# Patient Record
Sex: MALE | Race: BLACK OR AFRICAN AMERICAN | NOT HISPANIC OR LATINO | Employment: FULL TIME | URBAN - METROPOLITAN AREA
[De-identification: names, ages, dates, MRNs, and addresses within clinical notes are randomized per-mention and may not be internally consistent; named-entity substitution may affect disease eponyms.]

---

## 2018-11-13 ENCOUNTER — HOSPITAL ENCOUNTER (EMERGENCY)
Facility: HOSPITAL | Age: 33
Discharge: HOME | End: 2018-11-13
Attending: EMERGENCY MEDICINE | Admitting: EMERGENCY MEDICINE

## 2018-11-13 ENCOUNTER — APPOINTMENT (OUTPATIENT)
Dept: CT IMAGING | Facility: HOSPITAL | Age: 33
End: 2018-11-13

## 2018-11-13 VITALS
DIASTOLIC BLOOD PRESSURE: 71 MMHG | SYSTOLIC BLOOD PRESSURE: 115 MMHG | RESPIRATION RATE: 18 BRPM | HEART RATE: 81 BPM | TEMPERATURE: 100.1 F | BODY MASS INDEX: 33.6 KG/M2 | WEIGHT: 240 LBS | HEIGHT: 71 IN | OXYGEN SATURATION: 97 %

## 2018-11-13 DIAGNOSIS — D72.829 LEUKOCYTOSIS, UNSPECIFIED TYPE: ICD-10-CM

## 2018-11-13 DIAGNOSIS — K57.92 ACUTE DIVERTICULITIS: Primary | ICD-10-CM

## 2018-11-13 LAB
ALBUMIN SERPL-MCNC: 4.77 G/DL (ref 3.2–4.8)
ALBUMIN/GLOB SERPL: 1.9 G/DL (ref 1.5–2.5)
ALP SERPL-CCNC: 99 U/L (ref 25–100)
ALT SERPL W P-5'-P-CCNC: 29 U/L (ref 7–40)
ANION GAP SERPL CALCULATED.3IONS-SCNC: 5 MMOL/L (ref 3–11)
AST SERPL-CCNC: 23 U/L (ref 0–33)
BASOPHILS # BLD AUTO: 0.02 10*3/MM3 (ref 0–0.2)
BASOPHILS NFR BLD AUTO: 0.1 % (ref 0–1)
BILIRUB SERPL-MCNC: 0.7 MG/DL (ref 0.3–1.2)
BILIRUB UR QL STRIP: NEGATIVE
BUN BLD-MCNC: 12 MG/DL (ref 9–23)
BUN/CREAT SERPL: 11 (ref 7–25)
CALCIUM SPEC-SCNC: 9.7 MG/DL (ref 8.7–10.4)
CHLORIDE SERPL-SCNC: 102 MMOL/L (ref 99–109)
CLARITY UR: CLEAR
CO2 SERPL-SCNC: 28 MMOL/L (ref 20–31)
COLOR UR: YELLOW
CREAT BLD-MCNC: 1.09 MG/DL (ref 0.6–1.3)
DEPRECATED RDW RBC AUTO: 37.3 FL (ref 37–54)
EOSINOPHIL # BLD AUTO: 0.05 10*3/MM3 (ref 0–0.3)
EOSINOPHIL NFR BLD AUTO: 0.2 % (ref 0–3)
ERYTHROCYTE [DISTWIDTH] IN BLOOD BY AUTOMATED COUNT: 12 % (ref 11.3–14.5)
GFR SERPL CREATININE-BSD FRML MDRD: 95 ML/MIN/1.73
GLOBULIN UR ELPH-MCNC: 2.5 GM/DL
GLUCOSE BLD-MCNC: 95 MG/DL (ref 70–100)
GLUCOSE UR STRIP-MCNC: NEGATIVE MG/DL
HCT VFR BLD AUTO: 46.6 % (ref 38.9–50.9)
HGB BLD-MCNC: 15.1 G/DL (ref 13.1–17.5)
HGB UR QL STRIP.AUTO: NEGATIVE
HOLD SPECIMEN: NORMAL
HOLD SPECIMEN: NORMAL
IMM GRANULOCYTES # BLD: 0.2 10*3/MM3 (ref 0–0.03)
IMM GRANULOCYTES NFR BLD: 0.9 % (ref 0–0.6)
KETONES UR QL STRIP: NEGATIVE
LEUKOCYTE ESTERASE UR QL STRIP.AUTO: NEGATIVE
LIPASE SERPL-CCNC: 49 U/L (ref 6–51)
LYMPHOCYTES # BLD AUTO: 3.08 10*3/MM3 (ref 0.6–4.8)
LYMPHOCYTES NFR BLD AUTO: 14.6 % (ref 24–44)
MCH RBC QN AUTO: 27.9 PG (ref 27–31)
MCHC RBC AUTO-ENTMCNC: 32.4 G/DL (ref 32–36)
MCV RBC AUTO: 86 FL (ref 80–99)
MONOCYTES # BLD AUTO: 1.52 10*3/MM3 (ref 0–1)
MONOCYTES NFR BLD AUTO: 7.2 % (ref 0–12)
NEUTROPHILS # BLD AUTO: 16.19 10*3/MM3 (ref 1.5–8.3)
NEUTROPHILS NFR BLD AUTO: 77 % (ref 41–71)
NITRITE UR QL STRIP: NEGATIVE
PH UR STRIP.AUTO: 6.5 [PH] (ref 5–8)
PLATELET # BLD AUTO: 188 10*3/MM3 (ref 150–450)
PMV BLD AUTO: 12 FL (ref 6–12)
POTASSIUM BLD-SCNC: 4.1 MMOL/L (ref 3.5–5.5)
PROT SERPL-MCNC: 7.3 G/DL (ref 5.7–8.2)
PROT UR QL STRIP: NEGATIVE
RBC # BLD AUTO: 5.42 10*6/MM3 (ref 4.2–5.76)
SODIUM BLD-SCNC: 135 MMOL/L (ref 132–146)
SP GR UR STRIP: 1.05 (ref 1–1.03)
UROBILINOGEN UR QL STRIP: ABNORMAL
WBC NRBC COR # BLD: 21.06 10*3/MM3 (ref 3.5–10.8)
WHOLE BLOOD HOLD SPECIMEN: NORMAL
WHOLE BLOOD HOLD SPECIMEN: NORMAL

## 2018-11-13 PROCEDURE — 99283 EMERGENCY DEPT VISIT LOW MDM: CPT

## 2018-11-13 PROCEDURE — 83690 ASSAY OF LIPASE: CPT | Performed by: EMERGENCY MEDICINE

## 2018-11-13 PROCEDURE — 85025 COMPLETE CBC W/AUTO DIFF WBC: CPT | Performed by: EMERGENCY MEDICINE

## 2018-11-13 PROCEDURE — 96375 TX/PRO/DX INJ NEW DRUG ADDON: CPT

## 2018-11-13 PROCEDURE — 25010000002 HYDROMORPHONE 1 MG/ML SOLUTION: Performed by: EMERGENCY MEDICINE

## 2018-11-13 PROCEDURE — 96374 THER/PROPH/DIAG INJ IV PUSH: CPT

## 2018-11-13 PROCEDURE — 74177 CT ABD & PELVIS W/CONTRAST: CPT

## 2018-11-13 PROCEDURE — 81003 URINALYSIS AUTO W/O SCOPE: CPT | Performed by: EMERGENCY MEDICINE

## 2018-11-13 PROCEDURE — 25010000002 IOPAMIDOL 61 % SOLUTION: Performed by: EMERGENCY MEDICINE

## 2018-11-13 PROCEDURE — 25010000002 ONDANSETRON PER 1 MG: Performed by: EMERGENCY MEDICINE

## 2018-11-13 PROCEDURE — 80053 COMPREHEN METABOLIC PANEL: CPT | Performed by: EMERGENCY MEDICINE

## 2018-11-13 RX ORDER — SODIUM CHLORIDE 0.9 % (FLUSH) 0.9 %
10 SYRINGE (ML) INJECTION AS NEEDED
Status: DISCONTINUED | OUTPATIENT
Start: 2018-11-13 | End: 2018-11-13 | Stop reason: HOSPADM

## 2018-11-13 RX ORDER — METRONIDAZOLE 500 MG/1
500 TABLET ORAL 3 TIMES DAILY
Qty: 30 TABLET | Refills: 0 | Status: SHIPPED | OUTPATIENT
Start: 2018-11-13

## 2018-11-13 RX ORDER — METRONIDAZOLE 500 MG/1
500 TABLET ORAL ONCE
Status: COMPLETED | OUTPATIENT
Start: 2018-11-13 | End: 2018-11-13

## 2018-11-13 RX ORDER — CIPROFLOXACIN 250 MG/1
500 TABLET, FILM COATED ORAL EVERY 12 HOURS SCHEDULED
Status: COMPLETED | OUTPATIENT
Start: 2018-11-13 | End: 2018-11-13

## 2018-11-13 RX ORDER — CIPROFLOXACIN 500 MG/1
500 TABLET, FILM COATED ORAL EVERY 12 HOURS
Qty: 20 TABLET | Refills: 0 | Status: SHIPPED | OUTPATIENT
Start: 2018-11-13

## 2018-11-13 RX ORDER — ONDANSETRON 2 MG/ML
4 INJECTION INTRAMUSCULAR; INTRAVENOUS ONCE
Status: COMPLETED | OUTPATIENT
Start: 2018-11-13 | End: 2018-11-13

## 2018-11-13 RX ORDER — HYDROCODONE BITARTRATE AND ACETAMINOPHEN 5; 325 MG/1; MG/1
1 TABLET ORAL ONCE
Status: COMPLETED | OUTPATIENT
Start: 2018-11-13 | End: 2018-11-13

## 2018-11-13 RX ORDER — CIPROFLOXACIN 250 MG/1
500 TABLET, FILM COATED ORAL EVERY 12 HOURS SCHEDULED
Status: DISCONTINUED | OUTPATIENT
Start: 2018-11-13 | End: 2018-11-13

## 2018-11-13 RX ADMIN — HYDROMORPHONE HYDROCHLORIDE 1 MG: 1 INJECTION, SOLUTION INTRAMUSCULAR; INTRAVENOUS; SUBCUTANEOUS at 18:19

## 2018-11-13 RX ADMIN — CIPROFLOXACIN HYDROCHLORIDE 500 MG: 250 TABLET, FILM COATED ORAL at 19:39

## 2018-11-13 RX ADMIN — HYDROCODONE BITARTRATE AND ACETAMINOPHEN 1 TABLET: 5; 325 TABLET ORAL at 19:46

## 2018-11-13 RX ADMIN — IOPAMIDOL 95 ML: 612 INJECTION, SOLUTION INTRAVENOUS at 18:30

## 2018-11-13 RX ADMIN — ONDANSETRON 4 MG: 2 INJECTION INTRAMUSCULAR; INTRAVENOUS at 18:30

## 2018-11-13 RX ADMIN — SODIUM CHLORIDE 1000 ML: 9 INJECTION, SOLUTION INTRAVENOUS at 18:16

## 2018-11-13 RX ADMIN — METRONIDAZOLE 500 MG: 500 TABLET ORAL at 19:39

## 2018-11-13 NOTE — ED PROVIDER NOTES
Subjective   33-year-old male presents for evaluation of abdominal pain.  The patient states that after waking up from a nap this afternoon he began experiencing severe left-sided abdominal pain that has persisted since that time.  The pain seems to be worse in his left lower quadrant but also is bothersome in the left upper quadrant as well.  He describes the pain as sharp and stabbing.  He endorses accompanying nausea but no vomiting.  No diarrhea.  No sick contacts.  No recent travel.  No recent diet changes.  The pain is currently 8 out of 10 in severity and worse with palpation.        History provided by:  Patient  Abdominal Pain   Pain location:  LLQ and LUQ  Pain quality: sharp and throbbing    Pain severity:  Moderate  Onset quality:  Gradual  Timing:  Constant  Progression:  Worsening  Chronicity:  New  Associated symptoms: no vomiting        Review of Systems   Gastrointestinal: Positive for abdominal pain. Negative for vomiting.   All other systems reviewed and are negative.      History reviewed. No pertinent past medical history.    No Known Allergies    History reviewed. No pertinent surgical history.    History reviewed. No pertinent family history.    Social History     Socioeconomic History   • Marital status:      Spouse name: Not on file   • Number of children: Not on file   • Years of education: Not on file   • Highest education level: Not on file   Tobacco Use   • Smoking status: Unknown If Ever Smoked   • Smokeless tobacco: Never Used   Substance and Sexual Activity   • Alcohol use: No     Frequency: Never   • Drug use: No         Objective   Physical Exam   Constitutional: He is oriented to person, place, and time. He appears well-developed and well-nourished. No distress.   Well-appearing male in no acute distress   HENT:   Head: Normocephalic and atraumatic.   Mouth/Throat: Oropharynx is clear and moist.   Neck: Normal range of motion. No JVD present.   Cardiovascular: Normal rate,  regular rhythm and normal heart sounds. Exam reveals no gallop and no friction rub.   No murmur heard.  Pulmonary/Chest: Effort normal and breath sounds normal. No respiratory distress. He has no wheezes. He has no rales.   Abdominal: Soft. Bowel sounds are normal. He exhibits no distension and no mass. There is tenderness. There is guarding.   Focal tenderness noted to left lower quadrant and left upper quadrant with voluntary guarding and rebound present   Genitourinary: Testes normal and penis normal.   Genitourinary Comments: Unremarkable  exam, normal testicular lie, no testicular tenderness noted   Musculoskeletal: Normal range of motion.   Neurological: He is alert and oriented to person, place, and time.   Skin: Skin is warm and dry. No rash noted. He is not diaphoretic. No erythema. No pallor.   Psychiatric: He has a normal mood and affect. Judgment and thought content normal.   Nursing note and vitals reviewed.      Procedures         ED Course  ED Course as of Nov 13 2228   Tue Nov 13, 2018   1802 33-year-old male presents complaining of left-sided abdominal pain times one day.  On arrival to the ED, patient nontoxic appearing but borderline febrile and tachycardic.  Labs remarkable for white blood cell count of 21,000.  Exam remarkable for focal tenderness to left upper quadrant and left upper quadrant with voluntary guarding and rebound present.  We will obtain imaging and we will reassess following IV fluids and medications.  [DD]   1858 CT revealed acute diverticulitis.  First doses of Cipro and Flagyl given in the emergency department.  Patient appropriate for outpatient treatment at this time.  Scripts for Cipro and Flagyl.  He will follow-up with his primary care physician within one week.  Agreeable with plan and given appropriate return precautions.  [DD]      ED Course User Index  [DD] Jose Alfredo Fernández MD         Recent Results (from the past 24 hour(s))   Light Blue Top    Collection  Time: 11/13/18  5:04 PM   Result Value Ref Range    Extra Tube hold for add-on    Green Top (Gel)    Collection Time: 11/13/18  5:04 PM   Result Value Ref Range    Extra Tube Hold for add-ons.    Lavender Top    Collection Time: 11/13/18  5:04 PM   Result Value Ref Range    Extra Tube hold for add-on    Gold Top - SST    Collection Time: 11/13/18  5:04 PM   Result Value Ref Range    Extra Tube Hold for add-ons.    Comprehensive Metabolic Panel    Collection Time: 11/13/18  5:04 PM   Result Value Ref Range    Glucose 95 70 - 100 mg/dL    BUN 12 9 - 23 mg/dL    Creatinine 1.09 0.60 - 1.30 mg/dL    Sodium 135 132 - 146 mmol/L    Potassium 4.1 3.5 - 5.5 mmol/L    Chloride 102 99 - 109 mmol/L    CO2 28.0 20.0 - 31.0 mmol/L    Calcium 9.7 8.7 - 10.4 mg/dL    Total Protein 7.3 5.7 - 8.2 g/dL    Albumin 4.77 3.20 - 4.80 g/dL    ALT (SGPT) 29 7 - 40 U/L    AST (SGOT) 23 0 - 33 U/L    Alkaline Phosphatase 99 25 - 100 U/L    Total Bilirubin 0.7 0.3 - 1.2 mg/dL    eGFR  African Amer 95 >60 mL/min/1.73    Globulin 2.5 gm/dL    A/G Ratio 1.9 1.5 - 2.5 g/dL    BUN/Creatinine Ratio 11.0 7.0 - 25.0    Anion Gap 5.0 3.0 - 11.0 mmol/L   Lipase    Collection Time: 11/13/18  5:04 PM   Result Value Ref Range    Lipase 49 6 - 51 U/L   CBC Auto Differential    Collection Time: 11/13/18  5:04 PM   Result Value Ref Range    WBC 21.06 (H) 3.50 - 10.80 10*3/mm3    RBC 5.42 4.20 - 5.76 10*6/mm3    Hemoglobin 15.1 13.1 - 17.5 g/dL    Hematocrit 46.6 38.9 - 50.9 %    MCV 86.0 80.0 - 99.0 fL    MCH 27.9 27.0 - 31.0 pg    MCHC 32.4 32.0 - 36.0 g/dL    RDW 12.0 11.3 - 14.5 %    RDW-SD 37.3 37.0 - 54.0 fl    MPV 12.0 6.0 - 12.0 fL    Platelets 188 150 - 450 10*3/mm3    Neutrophil % 77.0 (H) 41.0 - 71.0 %    Lymphocyte % 14.6 (L) 24.0 - 44.0 %    Monocyte % 7.2 0.0 - 12.0 %    Eosinophil % 0.2 0.0 - 3.0 %    Basophil % 0.1 0.0 - 1.0 %    Immature Grans % 0.9 (H) 0.0 - 0.6 %    Neutrophils, Absolute 16.19 (H) 1.50 - 8.30 10*3/mm3    Lymphocytes,  "Absolute 3.08 0.60 - 4.80 10*3/mm3    Monocytes, Absolute 1.52 (H) 0.00 - 1.00 10*3/mm3    Eosinophils, Absolute 0.05 0.00 - 0.30 10*3/mm3    Basophils, Absolute 0.02 0.00 - 0.20 10*3/mm3    Immature Grans, Absolute 0.20 (H) 0.00 - 0.03 10*3/mm3     Note: In addition to lab results from this visit, the labs listed above may include labs taken at another facility or during a different encounter within the last 24 hours. Please correlate lab times with ED admission and discharge times for further clarification of the services performed during this visit.    CT Abdomen Pelvis With Contrast    (Results Pending)     Vitals:    11/13/18 1651   BP: 123/77   BP Location: Left arm   Patient Position: Sitting   Pulse: 105   Resp: 18   Temp: 100.1 °F (37.8 °C)   TempSrc: Oral   SpO2: 97%   Weight: 109 kg (240 lb)   Height: 180.3 cm (71\")     Medications   sodium chloride 0.9 % flush 10 mL (not administered)   sodium chloride 0.9 % bolus 1,000 mL (not administered)   HYDROmorphone (DILAUDID) injection 1 mg (1 mg Intravenous Given 11/13/18 1819)   ondansetron (ZOFRAN) injection 4 mg (4 mg Intravenous Given 11/13/18 1830)   iopamidol (ISOVUE-300) 61 % injection 100 mL (95 mL Intravenous Given 11/13/18 1830)     ECG/EMG Results (last 24 hours)     ** No results found for the last 24 hours. **              Recent Results (from the past 24 hour(s))   Light Blue Top    Collection Time: 11/13/18  5:04 PM   Result Value Ref Range    Extra Tube hold for add-on    Green Top (Gel)    Collection Time: 11/13/18  5:04 PM   Result Value Ref Range    Extra Tube Hold for add-ons.    Lavender Top    Collection Time: 11/13/18  5:04 PM   Result Value Ref Range    Extra Tube hold for add-on    Gold Top - SST    Collection Time: 11/13/18  5:04 PM   Result Value Ref Range    Extra Tube Hold for add-ons.    Comprehensive Metabolic Panel    Collection Time: 11/13/18  5:04 PM   Result Value Ref Range    Glucose 95 70 - 100 mg/dL    BUN 12 9 - 23 mg/dL "    Creatinine 1.09 0.60 - 1.30 mg/dL    Sodium 135 132 - 146 mmol/L    Potassium 4.1 3.5 - 5.5 mmol/L    Chloride 102 99 - 109 mmol/L    CO2 28.0 20.0 - 31.0 mmol/L    Calcium 9.7 8.7 - 10.4 mg/dL    Total Protein 7.3 5.7 - 8.2 g/dL    Albumin 4.77 3.20 - 4.80 g/dL    ALT (SGPT) 29 7 - 40 U/L    AST (SGOT) 23 0 - 33 U/L    Alkaline Phosphatase 99 25 - 100 U/L    Total Bilirubin 0.7 0.3 - 1.2 mg/dL    eGFR  African Amer 95 >60 mL/min/1.73    Globulin 2.5 gm/dL    A/G Ratio 1.9 1.5 - 2.5 g/dL    BUN/Creatinine Ratio 11.0 7.0 - 25.0    Anion Gap 5.0 3.0 - 11.0 mmol/L   Lipase    Collection Time: 11/13/18  5:04 PM   Result Value Ref Range    Lipase 49 6 - 51 U/L   CBC Auto Differential    Collection Time: 11/13/18  5:04 PM   Result Value Ref Range    WBC 21.06 (H) 3.50 - 10.80 10*3/mm3    RBC 5.42 4.20 - 5.76 10*6/mm3    Hemoglobin 15.1 13.1 - 17.5 g/dL    Hematocrit 46.6 38.9 - 50.9 %    MCV 86.0 80.0 - 99.0 fL    MCH 27.9 27.0 - 31.0 pg    MCHC 32.4 32.0 - 36.0 g/dL    RDW 12.0 11.3 - 14.5 %    RDW-SD 37.3 37.0 - 54.0 fl    MPV 12.0 6.0 - 12.0 fL    Platelets 188 150 - 450 10*3/mm3    Neutrophil % 77.0 (H) 41.0 - 71.0 %    Lymphocyte % 14.6 (L) 24.0 - 44.0 %    Monocyte % 7.2 0.0 - 12.0 %    Eosinophil % 0.2 0.0 - 3.0 %    Basophil % 0.1 0.0 - 1.0 %    Immature Grans % 0.9 (H) 0.0 - 0.6 %    Neutrophils, Absolute 16.19 (H) 1.50 - 8.30 10*3/mm3    Lymphocytes, Absolute 3.08 0.60 - 4.80 10*3/mm3    Monocytes, Absolute 1.52 (H) 0.00 - 1.00 10*3/mm3    Eosinophils, Absolute 0.05 0.00 - 0.30 10*3/mm3    Basophils, Absolute 0.02 0.00 - 0.20 10*3/mm3    Immature Grans, Absolute 0.20 (H) 0.00 - 0.03 10*3/mm3   Urinalysis With Microscopic If Indicated (No Culture) - Urine, Clean Catch    Collection Time: 11/13/18  7:46 PM   Result Value Ref Range    Color, UA Yellow Yellow, Straw    Appearance, UA Clear Clear    pH, UA 6.5 5.0 - 8.0    Specific Gravity, UA 1.046 (H) 1.001 - 1.030    Glucose, UA Negative Negative    Ketones, UA  "Negative Negative    Bilirubin, UA Negative Negative    Blood, UA Negative Negative    Protein, UA Negative Negative    Leuk Esterase, UA Negative Negative    Nitrite, UA Negative Negative    Urobilinogen, UA 0.2 E.U./dL 0.2 - 1.0 E.U./dL     Note: In addition to lab results from this visit, the labs listed above may include labs taken at another facility or during a different encounter within the last 24 hours. Please correlate lab times with ED admission and discharge times for further clarification of the services performed during this visit.    CT Abdomen Pelvis With Contrast    (Results Pending)     Vitals:    11/13/18 1651 11/13/18 2044 11/13/18 2045   BP: 123/77  115/71   BP Location: Left arm     Patient Position: Sitting     Pulse: 105  81   Resp: 18     Temp: 100.1 °F (37.8 °C)     TempSrc: Oral     SpO2: 97% 97%    Weight: 109 kg (240 lb)     Height: 180.3 cm (71\")       Medications   sodium chloride 0.9 % flush 10 mL (not administered)   sodium chloride 0.9 % bolus 1,000 mL (0 mL Intravenous Stopped 11/13/18 2050)   HYDROmorphone (DILAUDID) injection 1 mg (1 mg Intravenous Given 11/13/18 1819)   ondansetron (ZOFRAN) injection 4 mg (4 mg Intravenous Given 11/13/18 1830)   iopamidol (ISOVUE-300) 61 % injection 100 mL (95 mL Intravenous Given 11/13/18 1830)   metroNIDAZOLE (FLAGYL) tablet 500 mg (500 mg Oral Given 11/13/18 1939)   ciprofloxacin (CIPRO) tablet 500 mg (500 mg Oral Given 11/13/18 1939)   HYDROcodone-acetaminophen (NORCO) 5-325 MG per tablet 1 tablet (1 tablet Oral Given 11/13/18 1946)     ECG/EMG Results (last 24 hours)     ** No results found for the last 24 hours. **            MDM    Final diagnoses:   Acute diverticulitis   Leukocytosis, unspecified type       Documentation assistance provided by mark Martinez.  Information recorded by the mark was done at my direction and has been verified and validated by me.     Deanne Martinez  11/13/18 1721       Deanne Martinez  11/13/18 " 1859       Pradeep, Jose Alfredo Alcala MD  11/13/18 4227

## 2018-11-13 NOTE — DISCHARGE INSTRUCTIONS
Return to the ER if you develop any acute or worsening symptoms.     Follow up with one of the following primary care providers in a week.     Follow up with one of the [facility] Primary Care Providers below to setup primary care. If you need assistance coordinating a primary care appointment with a [facility] Primary Care Provider, please contact the Primary Care Coordinators at [phone] for appointment scheduling.    [facility], Primary Care   [address], [address]   [place], [place] [address] [phone]    [facility] Internal Medicine & Endocrinology  [address], [address]  [place] [address] [phone]    [facility] Family Medicine  [address], [address]   [place] [address] [phone]    [facility] Primary Care  [address], [address]  [place] [address]  [phone]    [facility], Primary Care,   [address], [address]   [place], [place] [address] [phone]    [facility]  [address]  [place], [place] [address] [phone]    [facility], Primary Care  [address], [address]  [place], [place] [address] [phone]    [facility] Internal Medicine & Pediatrics  [address], [address]   [place] [address] [phone]    [facility], Primary Care  [address] [place] [address] [phone]      [facility]  [address], [address]   [place] [address] [phone]    [facility] Family [facility]  [address] [place] [address] [place] [address] [phone]

## 2019-01-11 ENCOUNTER — TRANSCRIBE ORDERS (OUTPATIENT)
Dept: ADMINISTRATIVE | Facility: HOSPITAL | Age: 34
End: 2019-01-11

## 2019-01-11 DIAGNOSIS — R10.32 LEFT LOWER QUADRANT PAIN: Primary | ICD-10-CM

## 2019-01-15 ENCOUNTER — HOSPITAL ENCOUNTER (OUTPATIENT)
Dept: CT IMAGING | Facility: HOSPITAL | Age: 34
Discharge: HOME | End: 2019-01-15
Admitting: NURSE PRACTITIONER

## 2019-01-15 DIAGNOSIS — R10.32 LEFT LOWER QUADRANT PAIN: ICD-10-CM

## 2019-01-15 PROCEDURE — 74176 CT ABD & PELVIS W/O CONTRAST: CPT

## 2019-08-21 ENCOUNTER — TRANSCRIBE ORDERS (OUTPATIENT)
Dept: GENERAL RADIOLOGY | Facility: HOSPITAL | Age: 34
End: 2019-08-21

## 2019-08-21 ENCOUNTER — HOSPITAL ENCOUNTER (OUTPATIENT)
Dept: GENERAL RADIOLOGY | Facility: HOSPITAL | Age: 34
Discharge: HOME | End: 2019-08-21
Admitting: NURSE PRACTITIONER

## 2019-08-21 DIAGNOSIS — S99.911A RIGHT ANKLE INJURY, INITIAL ENCOUNTER: ICD-10-CM

## 2019-08-21 DIAGNOSIS — S99.911A RIGHT ANKLE INJURY, INITIAL ENCOUNTER: Primary | ICD-10-CM

## 2019-08-21 PROCEDURE — 73610 X-RAY EXAM OF ANKLE: CPT

## 2024-09-09 ENCOUNTER — NURSE TRIAGE (OUTPATIENT)
Dept: CALL CENTER | Facility: HOSPITAL | Age: 39
End: 2024-09-09
Payer: COMMERCIAL

## 2024-09-09 NOTE — TELEPHONE ENCOUNTER
"The patient is calling to set up a new patient appointment and states that he has a quarter size swollen bruise behind the right knee. It has been gong on and off for the last few months. The bruise never fades.   Triage completed and patient warm transferred to the office.   Reason for Disposition   [1] After 10 days AND [2] bruise not fading    Additional Information   Negative: Shock suspected (e.g., cold/pale/clammy skin, too weak to stand, low BP, rapid pulse)   Negative: Sounds like a life-threatening emergency to the triager   Negative: Bruises with fever   Negative: Tiny bruises (spots or dots) of unknown cause   Negative: Bruise(s) of forehead or head   Negative: Bruise(s) of face or jaw   Negative: Followed an injury, and triager doesn't know which injury guideline to use first   Negative: Post-operative bruising   Negative: Dizziness or lightheadedness   Negative: [1] Bruise on head, face, chest, or abdomen AND [2] taking Coumadin (warfarin) or other strong blood thinner, or known bleeding disorder (e.g., thrombocytopenia)   Negative: Unexplained bleeding from another site (e.g., gums, nose, urine) as well   Negative: Patient sounds very sick or weak to the triager   Negative: [1] Not caused by an injury AND [2] 5 or more bruises now   Negative: [1] Raised bruise AND [2] size > 2 inches (5 cm) AND [3] getting bigger   Negative: [1] SEVERE pain AND [2] not improved 2 hours after pain medicine/ice packs   Negative: Suspicious history for the injury   Negative: Taking Coumadin (warfarin) or other strong blood thinner, or known bleeding disorder (e.g., thrombocytopenia)  (Exception: Very small, painless bruise at heparin injection site.)   Negative: [1] Not caused by an injury AND [2] < 5 unexplained bruises    Answer Assessment - Initial Assessment Questions  1. APPEARANCE of BRUISE: \"Describe the bruise.\"       Quarter size swollen bruise area that comes and goes on the inside of his right knee   2. SIZE: " "\"How large is the bruise?\"    Quarter  3. NUMBER: \"How many bruises are there?\"    1  4. LOCATION: \"Where is the bruise located?\"       Inside of right knee  5. ONSET: \"How long ago did the bruise occur?\"       Few months, comes and goes  6. CAUSE: \"Tell me how it happened.\"      Not sure   7. MEDICAL HISTORY: \"Do you have any medical problems that can cause easy bruising or bleeding?\" (e.g., leukemia, liver disease, recent chemotherapy)      No   8. MEDICINES: \"Do you take any medications which thin the blood such as: aspirin, heparin, ibuprofen (NSAIDS), Plavix, or Coumadin?\"      No   9. OTHER SYMPTOMS: \"Do you have any other symptoms?\"  (e.g., weakness, dizziness, pain, fever, nosebleed, blood in urine/stool)     Tender to touch  10. PREGNANCY: \"Is there any chance you are pregnant?\" \"When was your last menstrual period?\"       no    Protocols used: Bruises-ADULT-AH    "

## 2024-09-10 ENCOUNTER — OFFICE VISIT (OUTPATIENT)
Age: 39
End: 2024-09-10
Payer: COMMERCIAL

## 2024-09-10 VITALS
DIASTOLIC BLOOD PRESSURE: 78 MMHG | HEART RATE: 63 BPM | SYSTOLIC BLOOD PRESSURE: 124 MMHG | BODY MASS INDEX: 37.38 KG/M2 | OXYGEN SATURATION: 96 % | WEIGHT: 267 LBS | HEIGHT: 71 IN

## 2024-09-10 DIAGNOSIS — H69.93 DYSFUNCTION OF BOTH EUSTACHIAN TUBES: ICD-10-CM

## 2024-09-10 DIAGNOSIS — M25.561 CHRONIC PAIN OF RIGHT KNEE: ICD-10-CM

## 2024-09-10 DIAGNOSIS — G89.29 CHRONIC PAIN OF RIGHT KNEE: ICD-10-CM

## 2024-09-10 DIAGNOSIS — R10.32 CHRONIC LLQ PAIN: Primary | ICD-10-CM

## 2024-09-10 DIAGNOSIS — R09.89 VEIN SYMPTOM: ICD-10-CM

## 2024-09-10 DIAGNOSIS — F17.219 CIGARETTE NICOTINE DEPENDENCE WITH NICOTINE-INDUCED DISORDER: ICD-10-CM

## 2024-09-10 DIAGNOSIS — G89.29 CHRONIC LLQ PAIN: Primary | ICD-10-CM

## 2024-09-10 PROCEDURE — 99406 BEHAV CHNG SMOKING 3-10 MIN: CPT | Performed by: INTERNAL MEDICINE

## 2024-09-10 PROCEDURE — 99203 OFFICE O/P NEW LOW 30 MIN: CPT | Performed by: INTERNAL MEDICINE

## 2024-09-10 NOTE — PROGRESS NOTES
New Patient Note    Surinder Minaya is a 39 y.o. male.    Chief Complaint   Patient presents with    Establish Care    Earache     For two months, right ear, sore     Diverticulitis     Referral for GI     Lower Extremity Issue     Right knee, will flare up, quarter size bruise, not reduced or spread. High spike in blood pressure is when it will flare up.        HPI    Earache  - for 2 months right ear has had pain and feels like some drainage and hearing in and out  - massaged the face  - drainage from ear had wax appearance  - no fever  - does have some allergy issues, been more a recent issue this past year, used Claritin  - no issues with teeth grinding or TMJ  - no abx and not seen anyone    Diverticulitis  - first episode 6 years ago, no abx, self resolved in couple of weeks  - focused on what was eating  - had bloody BM prior to scope and none since that time  - had colonoscopy around 2018 - no polyps  - sitting position in the car LLQ feels full and pressure  - soft stool every day  - watching diet  - corn causes him issues  - no other episodes of diverticulitis  - the pressure feeling here for 90 days  - Mom has fatty liver disease  - no colon ca in the family  - no blood in stool  - no diarrhea  - previously with [facility]    Right Knee  - played football  - him and his wife were packing, had quarter sized bruise and a little swelling, still present and has not gone away for one year  - noticed some elevated BP, socks will be tight, watch band will be tight, only when that happens will be a low grade nagging pain  - think that when he was in college he might have torn meniscus and never repaired and now maybe causing some trouble  - bear weight ok, no change in life    Tobacco Abuse  - smoking periodically   - beginning of year tried to work on diet and exercise and have been trying to work on this  - Mon-Sun - 1-2 cig/week    Past Medical History:  There is no problem list on file for this patient.    "    Medications:  No current outpatient medications on file.     Allergy to Medications:  No Known Allergies     Immunizations:    There is no immunization history on file for this patient.    Surgeries:  History reviewed. No pertinent surgical history.     Family History:  Family History   Problem Relation Age of Onset    Hypertension Mother     High cholesterol Mother     Hypertension Father         Social:  Social History     Socioeconomic History    Marital status:    Tobacco Use    Smoking status: Some Days     Current packs/day: 0.10     Average packs/day: 0.1 packs/day for 6.7 years (0.7 ttl pk-yrs)     Types: Cigarettes     Start date: 2018    Smokeless tobacco: Never   Vaping Use    Vaping status: Some Days    Substances: Nicotine   Substance and Sexual Activity    Alcohol use: No    Drug use: Never    Sexual activity: Yes     Partners: Female       Household:  Occupation:[employer], engineering, works for dealership  Hobbies:  Exercise:      Objective   /78   Pulse 63   Ht 180.3 cm (70.98\")   Wt 121 kg (267 lb)   SpO2 96%   BMI 37.26 kg/m²   Class 2 Severe Obesity (BMI >=35 and <=39.9). Obesity-related health conditions include the following: none. Obesity is unchanged. BMI is is above average; BMI management plan is completed. We discussed portion control, increasing exercise, and Information on healthy weight added to patient's after visit summary.       Physical Exam  Vitals reviewed.   Constitutional:       General: He is not in acute distress.  HENT:      Mouth/Throat:      Mouth: Mucous membranes are moist.   Eyes:      Conjunctiva/sclera: Conjunctivae normal.   Neck:      Comments: No thyroid enlargement or nodularity  Cardiovascular:      Rate and Rhythm: Normal rate and regular rhythm.      Heart sounds: Normal heart sounds. No murmur heard.  Pulmonary:      Effort: Pulmonary effort is normal. No respiratory distress.      Breath sounds: Normal breath sounds.   Abdominal:      " "General: Abdomen is flat. Bowel sounds are normal. There is no distension.      Palpations: Abdomen is soft.      Tenderness: There is no abdominal tenderness.   Musculoskeletal:      Comments: Right Knee - Ant and Post drawer intact, negative Geovanna, negative varus and valgus testing, no TTP over quad insertion or at the patellar tendon. No effusion appreciated   Skin:     Comments: Quarter size area of hyperpigmentation vs ecchymoses near the right knee, medial thigh   Neurological:      Mental Status: He is alert.   Psychiatric:         Mood and Affect: Mood normal.         Thought Content: Thought content normal.         Judgment: Judgment normal.         Assessment & Plan     ETD  - discussed starting Zyrtec  - discussed starting Flonase or Nasocort  - no sx of TMJ  - ears not infected on exam  - minimal wax, discussed could use Debrox    LLQ Pressure  H/O Diverticulitis  - for the past 90 days has had pressure sensation over the LLQ, no pain, sometimes feels like a \"water bag\" there  - h/o diverticulitis 6 yrs ago, seen at [facility] post and completed cscope that was negative per patient  - since changing diet, no further flares like the original  - soft daily stool  - no weight loss, diarrhea, blood in stool, n/v, no true abd pain, no fam history of colon ca  - will refer back to [facility] for eval and consideration of cscope    Right Knee Pain  - played football 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  - he has had a nagging knee pain that comes and goes for about a year  - no swelling of knee, no giving way  - discussed ortho and PT, prefers to monitor for now    Right Lateral Thigh Quarter Sized Bruise vs Hyperpigmentation  - discussed that given history of increasing in size or feeling raised if BP is up after activity sometimes etc, this may represent a vein that is engorged or early varicose vein  - no history of clots  - no warmth, pain, swelling, erythema of leg or calf  - present for 1 year or more    Nicotine Dependence due " to Cigarette  - smoking periodically   - beginning of year tried to work on diet and exercise and have been trying to work on this  - Mon-Sun - 1-2 cig/week   - discussed given reduction to low use, likely just mind over matter, he is committed to quitting and will continue to work towards that goal.     Rei Louis .  reports that he has been smoking cigarettes. He started smoking about 6 years ago. He has a 0.7 pack-year smoking history. He has never used smokeless tobacco. I have educated him on the risk of diseases from using tobacco products such as cancer, COPD, and heart disease.     I advised him to quit and he is willing to quit. We have discussed the following method/s for tobacco cessation:  Counseling.  Together we have set a quit date for 1 week from today.  He will follow up with me in 3 months or sooner to check on his progress.    I spent 4 minutes counseling the patient.     FU in 3mo for annual    Health Care Maintenance:discuss next visit  HIV Screen:  Hep C Screen:  GC/Chlamydia Screen:    Colon Ca Screening: Start Screening at 44yo / Last Colonoscopy:  Result:  Mammogram: Start Screening at 39yo / Last mammogram:  Result:  Prostate Cancer Screening:  Lung Cancer: Positive smoking history screen at 51yo/Last result:    Immunizations:    DEXA:  AAA:    Lipids:  A1C:    Alejandra Sanchez MD, FAAP, FACP  Internal Medicine and Pediatrics  [facility]

## 2024-10-21 ENCOUNTER — OFFICE VISIT (OUTPATIENT)
Age: 39
End: 2024-10-21
Payer: COMMERCIAL

## 2024-10-21 VITALS
DIASTOLIC BLOOD PRESSURE: 68 MMHG | WEIGHT: 270 LBS | BODY MASS INDEX: 37.8 KG/M2 | SYSTOLIC BLOOD PRESSURE: 116 MMHG | HEART RATE: 85 BPM | OXYGEN SATURATION: 96 % | HEIGHT: 71 IN

## 2024-10-21 DIAGNOSIS — G89.29 CHRONIC LLQ PAIN: Primary | ICD-10-CM

## 2024-10-21 DIAGNOSIS — R10.32 CHRONIC LLQ PAIN: Primary | ICD-10-CM

## 2024-10-21 LAB
BILIRUB UR QL STRIP: NEGATIVE
CLARITY UR: CLEAR
COLOR UR: YELLOW
GLUCOSE UR STRIP-MCNC: NEGATIVE MG/DL
HGB UR QL STRIP.AUTO: NEGATIVE
KETONES UR QL STRIP: ABNORMAL
LEUKOCYTE ESTERASE UR QL STRIP.AUTO: NEGATIVE
NITRITE UR QL STRIP: NEGATIVE
PH UR STRIP.AUTO: 5.5 [PH] (ref 5–8)
PROT UR QL STRIP: ABNORMAL
SP GR UR STRIP: 1.03 (ref 1–1.03)
UROBILINOGEN UR QL STRIP: ABNORMAL

## 2024-10-21 PROCEDURE — 81001 URINALYSIS AUTO W/SCOPE: CPT | Performed by: INTERNAL MEDICINE

## 2024-10-21 PROCEDURE — 99214 OFFICE O/P EST MOD 30 MIN: CPT | Performed by: INTERNAL MEDICINE

## 2024-10-21 PROCEDURE — 87086 URINE CULTURE/COLONY COUNT: CPT | Performed by: INTERNAL MEDICINE

## 2024-10-22 LAB
BACTERIA UR QL AUTO: NORMAL /HPF
HYALINE CASTS UR QL AUTO: NORMAL /LPF
RBC # UR STRIP: NORMAL /HPF
REF LAB TEST METHOD: NORMAL
SQUAMOUS #/AREA URNS HPF: NORMAL /HPF
WBC # UR STRIP: NORMAL /HPF

## 2024-10-22 NOTE — PROGRESS NOTES
Progress Note    Subjective      Rei is a 39 y.o. male.    Chief Complaint   Patient presents with    LLQ pain       HPI  LLQ Pain    Today:  Noted that LLQ pain is continuing   Cscope was negative since last visit  Pain sometimes feels like radiating from back to groin  Had an episode a few years ago where passed blood and it resolved  Never really was checked out at the time  No fever  No pain with urination  No blood in urine  Pain ongoing for > 6 months    Last visit:  - diverticulitis first episode 6 years ago, no abx, self resolved in couple of weeks  - focused on what was eating  - had bloody BM prior to scope and none since that time  - had colonoscopy around 2018 - no polyps  - sitting position in the car LLQ feels full and pressure  - soft stool every day  - watching diet  - corn causes him issues  - no other episodes of diverticulitis  - the pressure feeling here for 90 days  - Mom has fatty liver disease  - no colon ca in the family  - no blood in stool  - no diarrhea  - previously with [facility]  Past Medical History:  There is no problem list on file for this patient.      Medications:  No current outpatient medications on file prior to visit.     No current facility-administered medications on file prior to visit.       Allergies:   No Known Allergies    Immunizations:    There is no immunization history on file for this patient.     Family History:  Family History   Problem Relation Age of Onset    Hypertension Mother     High cholesterol Mother     Hypertension Father     Colon cancer Neg Hx     Heart attack Neg Hx     Stroke Neg Hx     Prostate cancer Neg Hx        Social History:  Social History     Socioeconomic History    Marital status:    Tobacco Use    Smoking status: Some Days     Current packs/day: 0.00     Average packs/day: 0.1 packs/day for 6.8 years (0.7 ttl pk-yrs)     Types: Cigarettes     Start date: 2018     Last attempt to quit: 10/17/2024     Years since quittin.0     "Smokeless tobacco: Never   Vaping Use    Vaping status: Some Days    Substances: Nicotine   Substance and Sexual Activity    Alcohol use: No    Drug use: Never    Sexual activity: Yes     Partners: Female     Birth control/protection: None       Objective   /68   Pulse 85   Ht 180.3 cm (70.98\")   Wt 122 kg (270 lb)   SpO2 96%   BMI 37.67 kg/m²             Physical Exam  Vitals reviewed.   Constitutional:       General: He is not in acute distress.  HENT:      Mouth/Throat:      Mouth: Mucous membranes are moist.   Eyes:      Conjunctiva/sclera: Conjunctivae normal.   Cardiovascular:      Rate and Rhythm: Normal rate and regular rhythm.   Pulmonary:      Effort: Pulmonary effort is normal. No respiratory distress.      Breath sounds: Normal breath sounds.   Abdominal:      General: Abdomen is flat. Bowel sounds are normal. There is no distension.      Palpations: Abdomen is soft.      Tenderness: There is no abdominal tenderness.   Neurological:      Mental Status: He is alert.         Assessment & Plan   1. Chronic LLQ pain - uncontrolled  - reports that sometimes intermittent pain, history of passing blood in past, sometimes feels like a water bag there  - h/o diverticulitis but inconsistent with that   - completed colonoscopy since last visit and negative  - since changing diet, no further flares like the original  - will explore possibility of stone, will also assess with CT scan to rule out mass etc  - soft daily stool  - no weight loss, diarrhea, blood in stool, n/v, no true abd pain, no fam history of colon ca  - Urinalysis With Microscopic - Urine, Clean Catch; Future  - Urine Culture - Urine, Urine, Clean Catch; Future  - CT Abdomen Pelvis With Contrast; Future  - Urinalysis With Microscopic - Urine, Clean Catch  - Urine Culture - Urine, Urine, Clean Catch    FU 12/2024 or sooner if needed    Alejandra Sanchez MD, FAAP, FACP  Internal Medicine and Pediatrics  [facility]  "

## 2024-10-23 LAB — BACTERIA SPEC AEROBE CULT: NO GROWTH

## 2024-11-07 ENCOUNTER — HOSPITAL ENCOUNTER (OUTPATIENT)
Facility: HOSPITAL | Age: 39
Discharge: HOME | End: 2024-11-07
Admitting: INTERNAL MEDICINE
Payer: COMMERCIAL

## 2024-11-07 DIAGNOSIS — R10.32 CHRONIC LLQ PAIN: ICD-10-CM

## 2024-11-07 DIAGNOSIS — G89.29 CHRONIC LLQ PAIN: ICD-10-CM

## 2024-11-07 PROCEDURE — 25510000001 IOPAMIDOL 61 % SOLUTION: Performed by: INTERNAL MEDICINE

## 2024-11-07 PROCEDURE — 74177 CT ABD & PELVIS W/CONTRAST: CPT

## 2024-11-07 RX ORDER — IOPAMIDOL 612 MG/ML
100 INJECTION, SOLUTION INTRAVASCULAR
Status: COMPLETED | OUTPATIENT
Start: 2024-11-07 | End: 2024-11-07

## 2024-11-07 RX ADMIN — IOPAMIDOL 85 ML: 612 INJECTION, SOLUTION INTRAVENOUS at 15:55

## 2024-11-11 DIAGNOSIS — N32.89 BLADDER WALL THICKENING: Primary | ICD-10-CM

## 2024-12-12 ENCOUNTER — OFFICE VISIT (OUTPATIENT)
Age: 39
End: 2024-12-12
Payer: COMMERCIAL

## 2024-12-12 ENCOUNTER — LAB (OUTPATIENT)
Age: 39
End: 2024-12-12
Payer: COMMERCIAL

## 2024-12-12 VITALS
DIASTOLIC BLOOD PRESSURE: 74 MMHG | WEIGHT: 268 LBS | HEART RATE: 80 BPM | SYSTOLIC BLOOD PRESSURE: 118 MMHG | OXYGEN SATURATION: 96 % | BODY MASS INDEX: 37.52 KG/M2 | HEIGHT: 71 IN

## 2024-12-12 DIAGNOSIS — R73.9 HYPERGLYCEMIA: ICD-10-CM

## 2024-12-12 DIAGNOSIS — Z13.220 SCREENING FOR HYPERLIPIDEMIA: ICD-10-CM

## 2024-12-12 DIAGNOSIS — G89.29 CHRONIC LLQ PAIN: ICD-10-CM

## 2024-12-12 DIAGNOSIS — R10.32 CHRONIC LLQ PAIN: ICD-10-CM

## 2024-12-12 DIAGNOSIS — Z00.00 WELLNESS EXAMINATION: Primary | ICD-10-CM

## 2024-12-12 LAB
ALBUMIN SERPL-MCNC: 4.7 G/DL (ref 3.5–5.2)
ALBUMIN/GLOB SERPL: 1.7 G/DL
ALP SERPL-CCNC: 91 U/L (ref 39–117)
ALT SERPL W P-5'-P-CCNC: 27 U/L (ref 1–41)
ANION GAP SERPL CALCULATED.3IONS-SCNC: 14 MMOL/L (ref 5–15)
AST SERPL-CCNC: 32 U/L (ref 1–40)
BILIRUB SERPL-MCNC: 0.3 MG/DL (ref 0–1.2)
BUN SERPL-MCNC: 15 MG/DL (ref 6–20)
BUN/CREAT SERPL: 11.9 (ref 7–25)
CALCIUM SPEC-SCNC: 9.7 MG/DL (ref 8.6–10.5)
CHLORIDE SERPL-SCNC: 101 MMOL/L (ref 98–107)
CHOLEST SERPL-MCNC: 264 MG/DL (ref 0–200)
CO2 SERPL-SCNC: 25 MMOL/L (ref 22–29)
CREAT SERPL-MCNC: 1.26 MG/DL (ref 0.76–1.27)
EGFRCR SERPLBLD CKD-EPI 2021: 74.9 ML/MIN/1.73
GLOBULIN UR ELPH-MCNC: 2.8 GM/DL
GLUCOSE SERPL-MCNC: 100 MG/DL (ref 65–99)
HBA1C MFR BLD: 5 % (ref 4.8–5.6)
HDLC SERPL-MCNC: 39 MG/DL (ref 40–60)
LDLC SERPL CALC-MCNC: 162 MG/DL (ref 0–100)
LDLC/HDLC SERPL: 4.07 {RATIO}
POTASSIUM SERPL-SCNC: 4.3 MMOL/L (ref 3.5–5.2)
PROT SERPL-MCNC: 7.5 G/DL (ref 6–8.5)
SODIUM SERPL-SCNC: 140 MMOL/L (ref 136–145)
TRIGL SERPL-MCNC: 331 MG/DL (ref 0–150)
VLDLC SERPL-MCNC: 63 MG/DL (ref 5–40)

## 2024-12-12 PROCEDURE — 99395 PREV VISIT EST AGE 18-39: CPT | Performed by: INTERNAL MEDICINE

## 2024-12-12 PROCEDURE — 80061 LIPID PANEL: CPT | Performed by: INTERNAL MEDICINE

## 2024-12-12 PROCEDURE — 85025 COMPLETE CBC W/AUTO DIFF WBC: CPT | Performed by: INTERNAL MEDICINE

## 2024-12-12 PROCEDURE — 36415 COLL VENOUS BLD VENIPUNCTURE: CPT | Performed by: INTERNAL MEDICINE

## 2024-12-12 PROCEDURE — 83036 HEMOGLOBIN GLYCOSYLATED A1C: CPT | Performed by: INTERNAL MEDICINE

## 2024-12-12 PROCEDURE — 80053 COMPREHEN METABOLIC PANEL: CPT | Performed by: INTERNAL MEDICINE

## 2024-12-12 NOTE — PROGRESS NOTES
Subjective      Rei Louis Jr.     hospitals     Mr. Louis is here today for their annual visit.     General Health:  Reported Health: Good     Social History:  Household Members: Spouse / Children   Marital Status:   Housing Situation: Stable   Work Status: Employed Full Time   Occupation:  Hobbies/ Travel:    General Health:  Diet: cut out fast food completely, making choices for  foods, reduce red meat, get some probiotics or yogurt  Caffeine:cut back, no longer doing energy drinks, works 3rd shift, does do 2 cups of coffee  Calcium Intake:lactose intolerant, does yogurt, taking supplements  Weight Concerns:yes, working on it on his own  Supplements:vit d, vitamins  Exercise:started recently in Oct, previously , but trying to work out, working out with wife too  Screen Time:    Dental Exam:q6mo  Dental Concerns:no    Vision Exam:ev 2 years  Vision Concerns:no    Hearing Loss:no    Risky Behaviors:  Seatbelt Use:yes  Texting While Driving:no    Tobacco Use:cutting back, working on it, ecigg, [place] in [place] to [place], none since that time  If positive:     pack years    Alcohol Use:no  If positive, consider AUDITC    Drug Use:no  If positive, consider DAST    Reproductive Health:    BPH Symptoms: Difficulty initiating stream / Loss of Strength of Stream / Frequently getting up to urinate at Night:  no  Last PSA:  History of Prostate Cancer in Family:no        Mood:  Depression Screening:    PHQ-9 Depression Screening  Little interest or pleasure in doing things?  no   Feeling down, depressed, or hopeless?  no   PHQ-2 Total Score     Trouble falling or staying asleep, or sleeping too much?     Feeling tired or having little energy?     Poor appetite or overeating?     Feeling bad about yourself - or that you are a failure or have let yourself or your family down?     Trouble concentrating on things, such as reading the newspaper or watching television?     Moving or speaking so slowly that other  people could have noticed? Or the opposite - being so fidgety or restless that you have been moving around a lot more than usual?     Thoughts that you would be better off dead, or of hurting yourself in some way?     PHQ-9 Total Score     If you checked off any problems, how difficult have these problems made it for you to do your work, take care of things at home, or get along with other people?        PHQ-9 Total Score:      Anxiety: GAD7 Score:    Past Medical History:  There is no problem list on file for this patient.      Allergies:  No Known Allergies    Medications:  No current outpatient medications on file.    Surgical History:  Past Surgical History:   Procedure Laterality Date    COLONOSCOPY  10/17/24    None, results come back good        Family History:  Family History   Problem Relation Age of Onset    Hypertension Mother     High cholesterol Mother     Hypertension Father     Colon cancer Neg Hx     Heart attack Neg Hx     Stroke Neg Hx     Prostate cancer Neg Hx        Any change in family history since last annual visit: no  Any family history of colon cancer, breast cancer, prostate cancer, early CAD: no    Family History   Problem Relation Age of Onset    Hypertension Mother     High cholesterol Mother     Hypertension Father     Colon cancer Neg Hx     Heart attack Neg Hx     Stroke Neg Hx     Prostate cancer Neg Hx         The following portions of the patient's chart were reviewed in this encounter and updated as appropriate: Past Medical History, Surgical History, Family History, and Social History         Review of Systems      Objective       Vitals:    12/12/24 0812   BP: 118/74   Pulse: 80   SpO2: 96%               Physical Exam  Vitals reviewed.   Constitutional:       General: He is not in acute distress.     Appearance: He is obese.   HENT:      Nose: Nose normal.      Mouth/Throat:      Mouth: Mucous membranes are moist.   Eyes:      Conjunctiva/sclera: Conjunctivae normal.    Cardiovascular:      Rate and Rhythm: Normal rate and regular rhythm.      Heart sounds: Normal heart sounds. No murmur heard.  Pulmonary:      Effort: Pulmonary effort is normal. No respiratory distress.      Breath sounds: Normal breath sounds.   Abdominal:      General: Abdomen is flat. Bowel sounds are normal. There is no distension.      Palpations: Abdomen is soft.   Skin:     General: Skin is warm and dry.   Neurological:      Mental Status: He is alert.   Psychiatric:         Mood and Affect: Mood normal.         Thought Content: Thought content normal.         Judgment: Judgment normal.     }     Assessment & Plan        Today was a preventative health visit: Patient was counseled on the following:  Lifestyle Changes: Weight Loss, Diet, Exercise  Calcium and Vitamin D Supplementation and Weight Bearing Exercise for Bone Health  Reproductive Health  Safety with driving  Work and Life Balance    Health Maintenance:    Infectious Disease Screening:  One Time HIV Screen: declined as pt felt low risk  One Time Hepatitis C Screen: declined as pt felt low risk    Vaccinations:  Tdap:2017  Hep A:  Hep B:  Shingles:  PPSV:  PCV:  COVID:  Flu:     Cancer Screening:  Colonoscopy:  Last date completed and Findings: 2024   Next Due:  10 year follow up  Prostate Cancer Screening: Last PSA:  Next Due: Discussed pursuing when age appropriate  Lung Cancer Screening: N/A nonsmoker currently, monitor    CV Screening:  Lipids: Last date completed:   Next Due: ordered today  A1C: Last date completed:   Next Due: ordered today  CBC, CMP ordered today    BP at goal < 130/80    Mood: PHQ 2 Negative    Recommended:Dental Visit / Eye Exam    Bone Health: Recommended appropriate vitamin D and Calcium intake    LLQ Pain has improved with diet changes and more fiber but still there intermittently. Will continue to monitor. CT ABD/PELVIS and Cscope completed    FU 3mo post Urology appt for bladder thickening in setting of no  JANIE Sanchez MD, FAAP, FACP  Internal Medicine and Pediatrics  [facility]

## 2024-12-13 LAB
BASOPHILS # BLD AUTO: 0.06 10*3/MM3 (ref 0–0.2)
BASOPHILS NFR BLD AUTO: 0.5 % (ref 0–1.5)
DEPRECATED RDW RBC AUTO: 35.7 FL (ref 37–54)
EOSINOPHIL # BLD AUTO: 0.1 10*3/MM3 (ref 0–0.4)
EOSINOPHIL NFR BLD AUTO: 0.8 % (ref 0.3–6.2)
ERYTHROCYTE [DISTWIDTH] IN BLOOD BY AUTOMATED COUNT: 11.7 % (ref 12.3–15.4)
HCT VFR BLD AUTO: 47.7 % (ref 37.5–51)
HGB BLD-MCNC: 14.8 G/DL (ref 13–17.7)
IMM GRANULOCYTES # BLD AUTO: 0.18 10*3/MM3 (ref 0–0.05)
IMM GRANULOCYTES NFR BLD AUTO: 1.5 % (ref 0–0.5)
LYMPHOCYTES # BLD AUTO: 4.59 10*3/MM3 (ref 0.7–3.1)
LYMPHOCYTES NFR BLD AUTO: 37.9 % (ref 19.6–45.3)
MCH RBC QN AUTO: 26.4 PG (ref 26.6–33)
MCHC RBC AUTO-ENTMCNC: 31 G/DL (ref 31.5–35.7)
MCV RBC AUTO: 85 FL (ref 79–97)
MONOCYTES # BLD AUTO: 0.64 10*3/MM3 (ref 0.1–0.9)
MONOCYTES NFR BLD AUTO: 5.3 % (ref 5–12)
NEUTROPHILS NFR BLD AUTO: 54 % (ref 42.7–76)
NEUTROPHILS NFR BLD AUTO: 6.55 10*3/MM3 (ref 1.7–7)
NRBC BLD AUTO-RTO: 0 /100 WBC (ref 0–0.2)
PLATELET # BLD AUTO: 227 10*3/MM3 (ref 140–450)
PMV BLD AUTO: 11.6 FL (ref 6–12)
RBC # BLD AUTO: 5.61 10*6/MM3 (ref 4.14–5.8)
WBC NRBC COR # BLD AUTO: 12.12 10*3/MM3 (ref 3.4–10.8)

## 2025-02-26 ENCOUNTER — OFFICE VISIT (OUTPATIENT)
Dept: UROLOGY | Facility: CLINIC | Age: 40
End: 2025-02-26
Payer: COMMERCIAL

## 2025-02-26 VITALS — HEART RATE: 81 BPM | SYSTOLIC BLOOD PRESSURE: 142 MMHG | OXYGEN SATURATION: 95 % | DIASTOLIC BLOOD PRESSURE: 82 MMHG

## 2025-02-26 DIAGNOSIS — R10.9 LEFT SIDED ABDOMINAL PAIN: ICD-10-CM

## 2025-02-26 DIAGNOSIS — N32.89 BLADDER WALL THICKENING: Primary | ICD-10-CM

## 2025-02-26 LAB
BILIRUB BLD-MCNC: NEGATIVE MG/DL
CLARITY, POC: CLEAR
COLOR UR: YELLOW
EXPIRATION DATE: NORMAL
GLUCOSE UR STRIP-MCNC: NEGATIVE MG/DL
KETONES UR QL: NEGATIVE
LEUKOCYTE EST, POC: NEGATIVE
Lab: NORMAL
NITRITE UR-MCNC: NEGATIVE MG/ML
PH UR: 6 [PH] (ref 5–8)
PROT UR STRIP-MCNC: NEGATIVE MG/DL
RBC # UR STRIP: NEGATIVE /UL
SP GR UR: 1.02 (ref 1–1.03)
UROBILINOGEN UR QL: NORMAL

## 2025-02-26 NOTE — PROGRESS NOTES
Office Visit New Urology      Patient Name: Rei Louis Jr.  : 1985   MRN: [id number]     Chief Complaint:    Chief Complaint   Patient presents with    Bladder wall thickening       Referring Provider: Alejandra Sanchez MD    History of Present Illness: Rei Louis Jr. is a 39 y.o. male who presents to Urology today for history of abdominal pain and bladder wall thickening.  He reports his pain initially began in .  At this time he experienced left upper abdominal pain this was intermittent until March.  He reports around March his pain completely resolved until  with associated dysuria and left upper abdominal pain.  He underwent CT abdomen pelvis with contrast 2024 without findings of hydronephrosis, nephrolithiasis.  There was evidence of mild bladder wall thickening. There is colon diverticulosis, and bilateral inguinal hernias.     He does report 1 episode of gross hematuria in  for which he thought he passed a kidney stone. No prior history of nephrolithiasis.    He has stopped smoking and vaping, he is working out more and drinking more water.  He is lost 15 pounds.  He did undergo a colonoscopy which was negative.  He currently denies dysuria.  He reports very intermittent mild weak urinary stream.    Urinalysis negative for blood or infection.   Bladder scan PVR 0cc.   IPSS 2.   Subjective      Review of System: Review of Systems   Genitourinary:  Negative for dysuria, frequency, hematuria and urgency.   All other systems reviewed and are negative.     I have reviewed the ROS documented by my clinical staff, I have updated appropriately and I agree. ARCENIO Paige    Past Medical History:   Past Medical History:   Diagnosis Date    Diverticulitis        Past Surgical History:   Past Surgical History:   Procedure Laterality Date    COLONOSCOPY  10/17/24    None, results come back good       Family History:   Family History   Problem Relation Age  of Onset    Hypertension Mother     High cholesterol Mother     Hypertension Father     Colon cancer Neg Hx     Heart attack Neg Hx     Stroke Neg Hx     Prostate cancer Neg Hx        Social History:   Social History     Socioeconomic History    Marital status:    Tobacco Use    Smoking status: Former     Current packs/day: 0.00     Average packs/day: 0.1 packs/day for 6.8 years (0.7 ttl pk-yrs)     Types: Cigarettes     Start date: 2018     Quit date: 2024     Years since quittin.3    Smokeless tobacco: Never   Vaping Use    Vaping status: Former    Substances: Nicotine   Substance and Sexual Activity    Alcohol use: Not Currently     Comment: occasionally    Drug use: Never    Sexual activity: Yes     Partners: Female     Birth control/protection: None       Medications:   No current outpatient medications on file.    Allergies:   No Known Allergies    IPSS Questionnaire (AUA-7):  Over the past month…    1)  Incomplete Emptying:       How often have you had a sensation of not emptying you had the sensation of not emptying your bladder completely after you finished urinating?  0 - Not at all   2)  Frequency:       How often have you had the urinate again less than two hours after you finished urinating?  0 - Not at all   3)  Intermittency:       How often have you found you stopped and started again several times when you urinated?   0 - Not at all   4) Urgency:      How often have you found it difficult to postpone urination?  0 - Not at all   5) Weak Stream:      How often have you had a weak urinary stream?  1 - Less than 1 time in 5   6) Straining:       How often have you had to push or strain to begin urination?  1 - Less than 1 time in 5   7) Nocturia:      How many times did you most typically get up to urinate from the time you went to bed at night until the time you got up in the morning?  0 - None   Total Score:  2   The International Prostate Symptom Score (IPSS) is used to screen,  diagnose, track symptoms of benign prostatic hyperplasia (BPH).   0-7 (Mild Symptoms) 8-19 (Moderate) 20-35 (Severe)   Quality of Life (QoL):  If you were to spend the rest of your life with your urinary condition just the way it is now, how would you feel about that? 0-Delighted   Urine Leakage (Incontinence) 0-No Leakage       Post void residual bladder scan:   0mL     Objective     Physical Exam:   Vital Signs:   Vitals:    02/26/25 1317   BP: 142/82   Pulse: 81   SpO2: 95%     There is no height or weight on file to calculate BMI.     Physical Exam  Vitals and nursing note reviewed.   Constitutional:       Appearance: Normal appearance.   HENT:      Head: Normocephalic and atraumatic.      Nose: Nose normal.      Mouth/Throat:      Mouth: Mucous membranes are moist.   Eyes:      Pupils: Pupils are equal, round, and reactive to light.   Pulmonary:      Effort: Pulmonary effort is normal.   Abdominal:      General: Abdomen is flat.      Palpations: Abdomen is soft.   Musculoskeletal:         General: Normal range of motion.      Cervical back: Normal range of motion.   Skin:     General: Skin is warm and dry.      Capillary Refill: Capillary refill takes less than 2 seconds.   Neurological:      General: No focal deficit present.      Mental Status: He is alert.   Psychiatric:         Mood and Affect: Mood normal.       Labs:   Brief Urine Lab Results  (Last result in the past 365 days)        Color   Clarity   Blood   Leuk Est   Nitrite   Protein   CREAT   Urine HCG        02/26/25 1323 Yellow   Clear   Negative   Negative   Negative   Negative                   Urine Culture          10/21/2024    16:16   Urine Culture   Urine Culture No growth         Lab Results   Component Value Date    GLUCOSE 100 (H) 12/12/2024    CALCIUM 9.7 12/12/2024     12/12/2024    K 4.3 12/12/2024    CO2 25.0 12/12/2024     12/12/2024    BUN 15 12/12/2024    CREATININE 1.26 12/12/2024    EGFRIFAFRI 95 11/13/2018    BCR  11.9 12/12/2024    ANIONGAP 14.0 12/12/2024       Lab Results   Component Value Date    WBC 12.12 (H) 12/12/2024    HGB 14.8 12/12/2024    HCT 47.7 12/12/2024    MCV 85.0 12/12/2024     12/12/2024       Images:   CT Abdomen Pelvis With Contrast    Result Date: 11/8/2024  Impression: 1. Hepatic steatosis 2. Colon diverticulosis with no evidence of acute diverticulitis 3. Diffuse bladder wall thickening may represent cystitis. Clinical and urinalysis correlation recommended 4. Bilateral omental fat inguinal hernias Electronically Signed: Dinh Mireles MD  11/8/2024 4:52 PM EST  Workstation ID: [id number]      Measures:   Tobacco:   Rei Louis Jr.  reports that he quit smoking about 3 months ago. His smoking use included cigarettes. He started smoking about 7 years ago. He has a 0.7 pack-year smoking history. He has never used smokeless tobacco.           Urine Incontinence: Patient reports that he is not currently experiencing any symptoms of urinary incontinence.    Assessment / Plan      Assessment/Plan:   Rei Louis Jr. is a 39 y.o. male who presented today for left abdominal pain and findings of bladder wall thickening.  We reviewed his CT scan without findings of nephrolithiasis or hydronephrosis.  There is very mild bladder wall thickening, discussed this is likely due to decompressed bladder.  We discussed that his pain is likely not urologic.  He will follow-up in 1 year for symptom check.  We did discuss if his stream worsens or he reports urinary straining or difficulty voiding we can plan for flexible cystoscopy.  He is agreeable with plan of care.    Diagnoses and all orders for this visit:    1. Bladder wall thickening (Primary)  -     POC Urinalysis Dipstick, Automated    2. Left sided abdominal pain       Follow Up:   Return in about 1 year (around 2/26/2026).    I spent approximately 20 minutes providing clinical care for this patient; including review of patient's chart and provider  documentation, face to face time spent with patient in examination room (obtaining history, performing physical exam, discussing diagnosis and management options), placing orders, and completing patient documentation.     ARCENIO Moore  [facility] [place]

## 2025-02-27 ENCOUNTER — PATIENT ROUNDING (BHMG ONLY) (OUTPATIENT)
Dept: UROLOGY | Facility: CLINIC | Age: 40
End: 2025-02-27
Payer: COMMERCIAL

## 2025-02-27 NOTE — PROGRESS NOTES
A Conjunct message has been sent to the patient for PATIENT ROUNDING with [facility].

## 2025-03-13 ENCOUNTER — OFFICE VISIT (OUTPATIENT)
Age: 40
End: 2025-03-13
Payer: COMMERCIAL

## 2025-03-13 VITALS
BODY MASS INDEX: 38.92 KG/M2 | OXYGEN SATURATION: 96 % | DIASTOLIC BLOOD PRESSURE: 84 MMHG | SYSTOLIC BLOOD PRESSURE: 122 MMHG | HEIGHT: 71 IN | WEIGHT: 278 LBS | HEART RATE: 88 BPM

## 2025-03-13 DIAGNOSIS — R10.32 CHRONIC LLQ PAIN: ICD-10-CM

## 2025-03-13 DIAGNOSIS — J06.9 URI, ACUTE: Primary | ICD-10-CM

## 2025-03-13 DIAGNOSIS — G89.29 CHRONIC LLQ PAIN: ICD-10-CM

## 2025-03-13 DIAGNOSIS — N32.89 BLADDER WALL THICKENING: ICD-10-CM

## 2025-03-13 LAB
EXPIRATION DATE: NORMAL
FLUAV AG UPPER RESP QL IA.RAPID: NOT DETECTED
FLUBV AG UPPER RESP QL IA.RAPID: NOT DETECTED
INTERNAL CONTROL: NORMAL
Lab: NORMAL
SARS-COV-2 AG UPPER RESP QL IA.RAPID: NOT DETECTED

## 2025-03-13 PROCEDURE — 99214 OFFICE O/P EST MOD 30 MIN: CPT | Performed by: INTERNAL MEDICINE

## 2025-03-13 PROCEDURE — 87428 SARSCOV & INF VIR A&B AG IA: CPT | Performed by: INTERNAL MEDICINE

## 2025-03-13 RX ORDER — BENZONATATE 100 MG/1
100 CAPSULE ORAL 3 TIMES DAILY PRN
Qty: 30 CAPSULE | Refills: 0 | Status: SHIPPED | OUTPATIENT
Start: 2025-03-13

## 2025-03-13 NOTE — PROGRESS NOTES
"Progress Note    Subjective      Rei is a 39 y.o. male.    Chief Complaint   Patient presents with    Bladder Problem    URI     Day three, head pressure, sinus pressure, deep chest congestion. Dry sharp cough.        URI         LLQ Pain  Improved on its own  Now 3/10, much better  Going to gym    URI  Day 3 of illness  ST, nasal congestion, cough  Now having some green nasal drainage  Coughing up some yellow  No fever  No face pain  No soa  No GI  No rash  Drinking ok    Bladder Thickening on Imaging  - Urology felt related to empty bladder during exam    Past Medical History:  There is no problem list on file for this patient.      Medications:  No current outpatient medications on file prior to visit.     No current facility-administered medications on file prior to visit.       Allergies:   No Known Allergies    Immunizations:  Immunization History   Administered Date(s) Administered    Fluzone (or Fluarix & Flulaval for VFC) >6mos 10/02/2024    Td (TDVAX) 2017        Family History:  Family History   Problem Relation Age of Onset    Hypertension Mother     High cholesterol Mother     Hypertension Father     Colon cancer Neg Hx     Heart attack Neg Hx     Stroke Neg Hx     Prostate cancer Neg Hx        Social History:  Social History     Socioeconomic History    Marital status:    Tobacco Use    Smoking status: Former     Current packs/day: 0.00     Average packs/day: 0.1 packs/day for 6.8 years (0.7 ttl pk-yrs)     Types: Cigarettes     Start date: 2018     Quit date: 2024     Years since quittin.3    Smokeless tobacco: Never   Vaping Use    Vaping status: Former    Substances: Nicotine   Substance and Sexual Activity    Alcohol use: Not Currently     Comment: occasionally    Drug use: Never    Sexual activity: Yes     Partners: Female     Birth control/protection: None       Objective   /84   Pulse 88   Ht 180.3 cm (70.98\")   Wt 126 kg (278 lb)   SpO2 96%   BMI 38.79 " kg/m²             Physical Exam  Vitals reviewed.   Constitutional:       General: He is not in acute distress.  HENT:      Right Ear: Tympanic membrane, ear canal and external ear normal.      Left Ear: Tympanic membrane, ear canal and external ear normal.      Nose: Congestion present.      Mouth/Throat:      Mouth: Mucous membranes are moist.      Pharynx: Posterior oropharyngeal erythema present. No oropharyngeal exudate.   Eyes:      Conjunctiva/sclera: Conjunctivae normal.   Cardiovascular:      Rate and Rhythm: Normal rate and regular rhythm.      Heart sounds: Normal heart sounds. No murmur heard.  Pulmonary:      Effort: Pulmonary effort is normal. No respiratory distress.      Breath sounds: Normal breath sounds.   Abdominal:      General: Abdomen is flat. Bowel sounds are normal.      Palpations: Abdomen is soft.   Skin:     General: Skin is warm and dry.   Neurological:      Mental Status: He is alert.         Assessment & Plan   URI, acute  - POCT SARS-CoV-2 + Flu Antigen SAMUEL negative today  Supportive care encouraged.  Continue to encourage oral hydration.  Monitor fever, which is a temp > 101, give antipyretics as needed.  If fever persists for 5 days call clinic.   If new concerning symptoms or symptoms worsen, call clinic or go to ED.   Counseled if increased work of breathing to go to ED.   Counseled if less than 3 voids in a day to call clinic or go to ED  Mucinex DM can be used to help break up congestion and suppress cough  Sudafed can be used for nasal congestion  Nasal saline can be used to relief nasal congestion  If symptoms are worsening or not improving within 10-14 days from onset call clinic/ED    Start Tessalon Perles    Discussed if face pain, fever, persistent sx for 10 days to message and can send abx if evolves to sinus infection  Discussed if moves to chest and coughing up a lot of purulent sputum, to message and we can discuss abx    Chronic LLQ pain - uncontrolled  - reports that  sometimes intermittent pain, history of passing blood in past, sometimes feels like a water bag there, this has all but resolved with some minor 3/10 LLQ pain only  - h/o diverticulitis but inconsistent with that   - completed colonoscopy since last visit and negative  - since changing diet, no further flares like the original  - CT Abd did not show stone, did note some bladder thickening  - soft daily stool  - no weight loss, diarrhea, blood in stool, n/v, no true abd pain, no fam history of colon ca  - Urinalysis With Microscopic - previously negative  - Urine Culture - previously negative    Bladder Wall Thickening  - thought to be due to empty bladder at time of scan  - saw Urology and feel no further workup  - FU in 1year    FU in Dec for annual    Alejandra Sanchez MD, FAAP, FACP  Internal Medicine and Pediatrics  [facility]